# Patient Record
Sex: MALE | Race: WHITE | URBAN - METROPOLITAN AREA
[De-identification: names, ages, dates, MRNs, and addresses within clinical notes are randomized per-mention and may not be internally consistent; named-entity substitution may affect disease eponyms.]

---

## 2021-10-12 ENCOUNTER — OFFICE VISIT (OUTPATIENT)
Dept: URBAN - METROPOLITAN AREA CLINIC 32 | Facility: CLINIC | Age: 71
End: 2021-10-12
Payer: MEDICARE

## 2021-10-12 DIAGNOSIS — H25.13 CATARACT - NSC: ICD-10-CM

## 2021-10-12 DIAGNOSIS — H53.041 AMBLYOPIA SUSPECT, RIGHT EYE: Primary | ICD-10-CM

## 2021-10-12 DIAGNOSIS — E11.9 TYPE 2 DIABETES MELLITUS W/O COMPLICATION: ICD-10-CM

## 2021-10-12 PROCEDURE — 92134 CPTRZ OPH DX IMG PST SGM RTA: CPT | Performed by: OPTOMETRIST

## 2021-10-12 PROCEDURE — 99204 OFFICE O/P NEW MOD 45 MIN: CPT | Performed by: OPTOMETRIST

## 2021-10-12 ASSESSMENT — INTRAOCULAR PRESSURE
OD: 13
OS: 17

## 2021-10-12 NOTE — IMPRESSION/PLAN
Impression: Type 2 diabetes mellitus w/o complication: Y38.0. Plan: Diabetes type II: no background retinopathy, no signs of neovascularization noted. Discussed ocular and systemic benefits of blood sugar control.

## 2021-10-12 NOTE — IMPRESSION/PLAN
Impression: Amblyopia suspect, right eye: H53.041.  Plan: long standing high myopia and reduced vision OD

## 2022-12-22 ENCOUNTER — OFFICE VISIT (OUTPATIENT)
Dept: URBAN - METROPOLITAN AREA CLINIC 32 | Facility: CLINIC | Age: 72
End: 2022-12-22
Payer: MEDICARE

## 2022-12-22 DIAGNOSIS — E11.3313 TYPE 2 DIAB W MODERATE NONPRLF DIAB RTNOP W MACULAR EDEMA, BILATERAL: Primary | ICD-10-CM

## 2022-12-22 PROCEDURE — 99214 OFFICE O/P EST MOD 30 MIN: CPT | Performed by: OPTOMETRIST

## 2022-12-22 PROCEDURE — 92134 CPTRZ OPH DX IMG PST SGM RTA: CPT | Performed by: OPTOMETRIST

## 2022-12-22 ASSESSMENT — KERATOMETRY
OD: 45.50
OS: 45.00

## 2022-12-22 ASSESSMENT — INTRAOCULAR PRESSURE
OS: 13
OD: 10

## 2022-12-22 NOTE — IMPRESSION/PLAN
Impression: Type 2 diab w moderate nonprlf diab rtnop w macular edema, bilateral: U89.4191. Plan: Diabetes type II: moderate background diabetic retinopathy, no signs of neovascularization noted. MAC-OCT ordered and reviewed with patient. Will refer patient for a retinal consult to determine if treatment is necessary. Discussed ocular and systemic benefits of maintaining blood sugar control.

## 2023-01-23 ENCOUNTER — OFFICE VISIT (OUTPATIENT)
Dept: URBAN - METROPOLITAN AREA CLINIC 33 | Facility: CLINIC | Age: 73
End: 2023-01-23
Payer: MEDICARE

## 2023-01-23 DIAGNOSIS — E11.9 TYPE 2 DIABETES MELLITUS W/O COMPLICATIONS: ICD-10-CM

## 2023-01-23 DIAGNOSIS — H35.373 PUCKERING OF MACULA, BILATERAL: Primary | ICD-10-CM

## 2023-01-23 PROCEDURE — 92134 CPTRZ OPH DX IMG PST SGM RTA: CPT | Performed by: OPHTHALMOLOGY

## 2023-01-23 PROCEDURE — 99204 OFFICE O/P NEW MOD 45 MIN: CPT | Performed by: OPHTHALMOLOGY

## 2023-01-23 ASSESSMENT — INTRAOCULAR PRESSURE
OS: 11
OD: 11

## 2023-01-23 NOTE — IMPRESSION/PLAN
Impression: Type 2 diabetes mellitus w/o complications: Z66.7. Bilateral. Condition: stable. Plan: Discussed diagnosis in detail with patient. Exam shows no Diabetic changes OU. No treatment is recommended at this time. Emphasized blood sugar control and advised to keep future appointments with PCP and/or Endocrinologist for the management of Diabetes. Recommend observation for now. Follow - up in 6 mos w/Dr Shiela Santos.

## 2023-01-23 NOTE — IMPRESSION/PLAN
Impression: Puckering of macula, bilateral: H35.373. Bilateral. Condition: new prob, no addtl w/u needed. Vision: vision affected. Plan: Discussed diagnosis in detail with patient. Exam shows ERM OU, patient states his vision is ok. Discussed treatment options with patient. Surgery can be consider for possible vision improvement. Patient defers surgery at this time. 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 Recommend a follow - up in 6 mos w/Dr Odalis Zheng.